# Patient Record
Sex: FEMALE | Race: WHITE | NOT HISPANIC OR LATINO | URBAN - METROPOLITAN AREA
[De-identification: names, ages, dates, MRNs, and addresses within clinical notes are randomized per-mention and may not be internally consistent; named-entity substitution may affect disease eponyms.]

---

## 2018-06-05 ENCOUNTER — OUTPATIENT (OUTPATIENT)
Dept: OUTPATIENT SERVICES | Facility: HOSPITAL | Age: 58
LOS: 1 days | End: 2018-06-05

## 2022-05-16 ENCOUNTER — APPOINTMENT (OUTPATIENT)
Dept: ORTHOPEDIC SURGERY | Facility: CLINIC | Age: 62
End: 2022-05-16
Payer: COMMERCIAL

## 2022-05-16 VITALS — BODY MASS INDEX: 29.88 KG/M2 | HEIGHT: 64 IN | WEIGHT: 175 LBS

## 2022-05-16 DIAGNOSIS — F17.200 NICOTINE DEPENDENCE, UNSPECIFIED, UNCOMPLICATED: ICD-10-CM

## 2022-05-16 PROBLEM — Z00.00 ENCOUNTER FOR PREVENTIVE HEALTH EXAMINATION: Status: ACTIVE | Noted: 2022-05-16

## 2022-05-16 PROCEDURE — 99213 OFFICE O/P EST LOW 20 MIN: CPT

## 2022-05-17 PROBLEM — F17.200 CURRENT SMOKER: Status: ACTIVE | Noted: 2022-05-16

## 2022-05-17 NOTE — HISTORY OF PRESENT ILLNESS
[Lower back] : lower back [Gradual] : gradual [6] : 6 [1] : 2 [Burning] : burning [Dull/Aching] : dull/aching [Radiating] : radiating [Throbbing] : throbbing [Tightness] : tightness [Tingling] : tingling [Full time] : Work status: full time [de-identified] : Patient states the severity of her pain is the same since her previous visit. No new complaints. Patient states her pain radiates from lower back into LT lower extremity. Limited ROM of LT lower extremity. Patient states she was approved for lumbar spine MRI scan is awaiting to schedule her appointment. Patient has continued with at home exercises/stretches as best tolerated. Treatment with OTC NSAIDs when needed provide relief. [] : no [FreeTextEntry5] : LOWER BACK PAIN STARTED ABOUT 3 YRS AGO. [FreeTextEntry6] : WEAKNESS, LACK OF MOBILITY [FreeTextEntry7] : LT LEG [de-identified] : DR. TAMEZ(02/2021) [de-identified] : CT L-SPINE DONE AT  01/2021 [de-identified] [facility]

## 2022-05-17 NOTE — DATA REVIEWED
[Outside X-rays] : outside x-rays [Lumbar Spine] : lumbar spine [I independently reviewed and interpreted images and report] : I independently reviewed and interpreted images and report [I reviewed the films/CD and additionally noted] : I reviewed the films/CD and additionally noted [FreeTextEntry1] : I stop paperwork reviewed

## 2022-05-17 NOTE — DISCUSSION/SUMMARY
[Medication Risks Reviewed] : Medication risks reviewed [de-identified] : Discussed proper body mechanics and modified physical activity to avoid aggravation of symptoms. Patient referred for lumbar spine MRI scan for worsening lower back pain radiating into LT lower extremity with worsening weakness despite medical intervention. Evaluate for disc herniation vs stenosis. Patient will follow up after MRI scan. Patient will continue with at home exercises/stretches as best tolerated and treatment with OTC NSAIDs as needed.

## 2022-05-17 NOTE — PHYSICAL EXAM
[Normal Coordination] : normal coordination [Normal DTR UE/LE] : normal DTR UE/LE  [Normal Sensation] : normal sensation [Normal Mood and Affect] : normal mood and affect [Orientated] : orientated [Able to Communicate] : able to communicate [Normal Skin] : normal skin [No Rash] : no rash [No Ulcers] : no ulcers [No Lesions] : no lesions [No obvious lymphadenopathy in areas examined] : no obvious lymphadenopathy in areas examined [Well Developed] : well developed [Well Nourished] : well nourished [Peripheral vascular exam is grossly normal] : peripheral vascular exam is grossly normal [No Respiratory Distress] : no respiratory distress [Lungs clear to auscultation bilaterally] : lungs clear to auscultation bilaterally [NL (90)] : forward flexion 90 degrees [NL (30)] : right lateral rotation 30 degrees [NL (45)] : extension 45 degrees [NL (40)] : right lateral bending 40 degrees [Flexion] : flexion [Extension] : extension [3___] : left hip flexion 3[unfilled]/5 [5___] : right extensor hallicus longus 5[unfilled]/5 [] : non-antalgic

## 2023-06-19 ENCOUNTER — APPOINTMENT (OUTPATIENT)
Dept: ORTHOPEDIC SURGERY | Facility: CLINIC | Age: 63
End: 2023-06-19
Payer: COMMERCIAL

## 2023-06-19 VITALS — BODY MASS INDEX: 29.88 KG/M2 | HEIGHT: 64 IN | WEIGHT: 175 LBS

## 2023-06-19 DIAGNOSIS — Z78.9 OTHER SPECIFIED HEALTH STATUS: ICD-10-CM

## 2023-06-19 PROCEDURE — 72100 X-RAY EXAM L-S SPINE 2/3 VWS: CPT

## 2023-06-19 PROCEDURE — 99214 OFFICE O/P EST MOD 30 MIN: CPT

## 2023-06-19 PROCEDURE — 73503 X-RAY EXAM HIP UNI 4/> VIEWS: CPT | Mod: LT

## 2023-06-19 RX ORDER — MELOXICAM 15 MG/1
15 TABLET ORAL DAILY
Qty: 30 | Refills: 1 | Status: ACTIVE | COMMUNITY
Start: 2023-06-19 | End: 1900-01-01

## 2023-06-25 NOTE — HISTORY OF PRESENT ILLNESS
[Gradual] : gradual [8] : 8 [5] : 5 [Radiating] : radiating [Frequent] : frequent [Heat] : heat [Standing] : standing [Walking] : walking [Stairs] : stairs [Full time] : Work status: full time [de-identified] : 06/19/2023 - Chief complaint of low back pain radiating into the left buttocks, posterior thigh, lateral calf. also c/o subjective weakness. symptoms are worse since she was last seen. pain worse with stairs, walking. no relief from nsaid usage. completed treatment with - no change in symptoms. weakness chronic, progressive. \par \par 5/16/23 - Patient states the severity of her pain is the same since her previous visit. No new complaints. Patient states her pain radiates from lower back into LT lower extremity. Limited ROM of LT lower extremity. Patient states she was approved for lumbar spine MRI scan is awaiting to schedule her appointment. Patient has continued with at home exercises/stretches as best tolerated. Treatment with OTC NSAIDs when needed provide relief. 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

## 2023-06-25 NOTE — DATA REVIEWED
[FreeTextEntry1] : On my interpretation of these images \par in office x-rays Lumbar spine ap/lat demonstrates degenerative scoliosis L3/4. good lordosis\par HIP XRAY taken today demonstrates no fx. no OA. \par \par I stop paperwork reviewed

## 2023-06-25 NOTE — PHYSICAL EXAM
[Normal Coordination] : normal coordination [Normal DTR UE/LE] : normal DTR UE/LE  [Normal Sensation] : normal sensation [Normal Mood and Affect] : normal mood and affect [Orientated] : orientated [Able to Communicate] : able to communicate [Normal Skin] : normal skin [No Rash] : no rash [No Ulcers] : no ulcers [No Lesions] : no lesions [No obvious lymphadenopathy in areas examined] : no obvious lymphadenopathy in areas examined [Well Developed] : well developed [Well Nourished] : well nourished [Peripheral vascular exam is grossly normal] : peripheral vascular exam is grossly normal [No Respiratory Distress] : no respiratory distress [Lungs clear to auscultation bilaterally] : lungs clear to auscultation bilaterally [Flexion] : flexion [Extension] : extension [4___] : left dorsiflexors 4[unfilled]/5 [] : non-antalgic [de-identified] : RT LE intact. left hip flexors 4/5

## 2023-06-25 NOTE — DISCUSSION/SUMMARY
This is a video visit.  The patient is interviewed after she gave verbal consent.    HPI:  The patient reported that she has been feeling \"okay\".    At this time of evaluation she denies feeling depressed.  She reports fair appetite.  She seemed to be more alert and attentive after her medication regimen was modified, denies  having fatigue, apathy and anhedonia.   Denies having difficulty in  concentration or memory .  Denies having episodes of  palpitation with chest pressure anymore.   She has been taking her psychotropics as directed. When asked about the potential ADRs she denies  having dizziness/vertigo anymore.    As far as her current main concern the patient reports that on 6/16/2020 she had fusion of hallux interphalangeal joint (IPJ) left .  She has been working from home.  Additionally  she needs a sleep study because she may have obstructive sleep apnea.  But current insurance does not approve the sleep study at the sleep center, instead she has to get the sleep  study at home.   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  She is working from home.   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  But she lost the job in June 2019.    She also said she  has a dysfunctional marriage.  She has been  to her  for 20 years but she claimed that the marriage is dysfunctional.  She feels that she and her  are disconnected and they are  not working as a team member or partner.  Instead she said she is taking care of her  and he shows no interest in sharing chores  at home.  She claimed that her  is \" lazy, demanding, negative and critical\".  Additionally  she said her  has difficulty in anger and impulsivity control. He can be verbally abusive, but denies he had physical aggression to his family members.    She has a hunch that her  may have Boise's disease because he has a  [Medication Risks Reviewed] : Medication risks reviewed strong family history of Shia[place]see's disease.  Additionally she is worried about her son who has been diagnosed with  Marfan's syndrome and he had a surgery at [facility]  for prevention of aortic dissection.    In terms of her past psychiatric history patient reported that she  started psychiatric treatment in 2003 and she saw  Dr. Alondra Sanderson at [facility]. She was diagnosed with  Generalized anxiety. Major depression,  Impulse control disorder with trichotillomania.  She was placed on the following med regimen by Dr Sanderson:    sertraline 100 mg two daily.  lorazepam 1 mg twice daily.  Bupropion  mg one daily .  She was placed the following med regimen by Dr Camp:    sertraline 100mg every 12 hours; bupropionXL 300mg every AM;  lorazepam 0.5mg every AM and  clonazepam 0.25mg every bedtime as needed for sleep.  She was placed the following med regimen by Dr Miller:    Sertraline 100mg every 12 hours ;  Bupropion 300mg every AM ; Clonazepam 0.5mg TID  When she saw Swati Montague and Jose Ramon she was put the same med regimen as Dr Miller did.   She   has no hx of inpatient treatment. She used to see Violet Franco  for therapy. She tried Paxil, Zoloft, wellbutrin, Lorazepam and Clonazepam before. She said she can't tolerate Paxil due to ADRs.           Patient Active Problem List   Diagnosis   • Hypothyroidism   • Irritable bowel syndrome   • Allergic rhinitis, cause unspecified   • Tobacco use disorder   • Major depressive disorder, recurrent episode (HCC)   • Generalized anxiety disorder   • Reflux esophagitis   • Relationship problems specific to childhood and adolescence   • Venous (peripheral) insufficiency   • Insomnia   • Carpal tunnel syndrome   • Cervicalgia   • Muscle weakness (generalized)   • Pain in thoracic spine   • Plantar fascial fibromatosis of left foot   • Achilles bursitis   • Heel spur   • Retrocalcaneal bursitis (back of heel)   • Left foot pain   • Hyperlipemia   • Urge  [de-identified] : In office x-rays Lumbar spine ap/lat demonstrates degenerative scoliosis L3/4. good lordosis\par HIP XRAY taken today demonstrates no fx. no OA. \par Patient referred for lumbar spine CT scan for worsening lower back pain radiating into LT lower extremity with worsening weakness despite medical intervention (nsaid usage, exercise based rehabilitation with ) . Evaluate for disc herniation vs stenosis. Patient will follow up after CT scan. Patient will continue with at home exercises/stretches as best tolerated and treatment with OTC NSAIDs as needed. F/U after CT Scan. \par \par Prior to appointment and during encounter with patient extensive medical records were reviewed including but not limited to, hospital records, outpatient records, imaging results, and lab data.During this appointment the patient was examined, diagnoses were discussed and explained in a face to face manner. In addition extensive time was spent reviewing aforementioned diagnostic studies. Counseling including abnormal image results, differential diagnoses, treatment options, risk and benefits, lifestyle changes, current condition, and current medications was performed. Patient's comments, questions, and concerns were addressed and patient verbalized understanding. Based on this patient's presentation at our office, which is an orthopedic spine surgeon's office, this patient inherently / intrinsically has a risk, however minute, of developing issues such as Cauda equina syndrome, bowel and bladder changes, or progression of motor or neurological deficits such as paralysis which may be permanent.\par \par ZAMZAM KELLY Acting as a Scribe for Zamzam Pacheco, attest that this documentation has been prepared under the direction and in the presence of Provider Emile Jauregui MD.  urinary incontinence   • SHAUN (stress urinary incontinence, female)   • Pelvic floor dysfunction   • Pelvic floor weakness in female         Current Outpatient Medications   Medication Sig Dispense Refill   • VITAMIN D PO      • hydrochlorothiazide (HYDRODIURIL) 12.5 MG tablet Take 1 tablet by mouth daily as needed (leg swelling). 30 tablet 3   • buPROPion (WELLBUTRIN SR) 150 MG 12 hr tablet Take 1 tablet by mouth daily. 30 tablet 0   • tiZANidine (ZANAFLEX) 4 MG tablet TAKE 1 TABLET BY MOUTH AT BEDTIME 30 tablet 0   • ibuprofen (MOTRIN) 200 MG tablet Take 200 mg by mouth every 6 hours as needed for Pain.     • pantoprazole (PROTONIX) 40 MG tablet Take 1 tablet by mouth 2 times daily. 60 tablet 5   • LORazepam (ATIVAN) 0.5 MG tablet Take 1 tablet by mouth 2 times daily as needed for Anxiety. 37 tablet 5   • sertraline (ZOLOFT) 100 MG tablet Take 1 tablet by mouth every 12 hours. 60 tablet 5   • propranolol (INDERAL LA) 60 MG 24 hr capsule Take 1 capsule by mouth nightly. TAKE 1 CAPSULE BY MOUTH BEFORE BEDTIME 30 capsule 5   • levothyroxine (SYNTHROID, LEVOTHROID) 100 MCG tablet Take 1 tablet by mouth daily. 90 tablet 2   • triamcinolone (ARISTOCORT) 0.1 % cream Apply topically 2 times daily. BID, PRN 30 g 1   • ipratropium (ATROVENT) 0.03 % nasal spray Spray 2 sprays in each nostril every 12 hours. 30 mL 6   • albuterol 108 (90 Base) MCG/ACT inhaler Inhale 1-2 puffs into the lungs every 4 hours. As need for wheezing     • aspirin 81 MG chewable tablet 1 TABLET DAILY     • fluticasone (FLONASE) 50 MCG/ACT nasal spray Spray 1 spray in each nostril. Spray 1 spray in each nostril.     • Omega-3 Fatty Acids (FISH OIL) 500 MG capsule        No current facility-administered medications for this visit.          SOCIAL HISTORY:   Patient is originally from [place]. She grew up in intact family.  She has high school education, employed as , working for a small company.  She is thinking of going back to [employer] to get college degree in accounting.    Currently, patient says that she has 3 children. She has been  twice and now is  for the second time and feels the marriage is dysfunctional. She disclosed that she has a boyfriend whom she is seeing in a romantic fashion.    She reports having family hx of  anxiety and  Depression. Patient denies smoking cigarettes.  occasionally she would smoke marijuana with her friends.       MENTAL STATUS EXAMINATION:   1.              Appearance: Well groomed and good hygiene  2.              Behavior:  friendly, cooperative  3.              Motor Activity:normal   4.              Speech:        a.             Volume: normal        b.             Rate: normal   5..             Mood: \"Pretty good\"  6.              Affect: calm and smiling  7.              Thought: process: Sequential  8.              TC:    no perceptual disturbance.    9.                 Cognitive Functioning:  Grossly intact.               ASSESSMENT  :   Axis I: Dysthymia;  generalized anxiety disorder; impulse control disorder, NOS; improved  Cannabis use disorder , in remission  Axis II: cluster B features.      PLAN:     #1.  Dysthymia, improved  Continue Wellbutrin 150 mg daily  Continue sertraline 100 mg twice daily    #2.  Generalized anxiety disorder,  improved  Continue sertraline 100 mg twice daily  Continue propanolol 60 mg at night  Continue lorazepam 0.5 mg qd PRN    During session patient is encouraged  to express her concerns by providing empathetic listening.  We discussed stress management.       They were also given counseling on sleep hygiene.    Discussed the neuropsychiatric symptomatology of cannabis use and how smoking marijuana affects her sleep architecture  Discussed with the patient about symptomatology of obstructive sleep apnea and how GIA affects her mental status and her cognitive function  Discussed with the patient about psychopharmacology of SSRI, Wellbutrin,   beta-blocker and benzodiazepines, clinical indications of taking her psychotropic medications and potential side effects    RTC in 3-6    months for follow-up. Call the office prn.      25  minutes spent face-to-face with the patient, greater than 50% of time spent in counseling, discussion of her diagnosis, management, progression and prognosis

## 2023-07-03 ENCOUNTER — RESULT REVIEW (OUTPATIENT)
Age: 63
End: 2023-07-03

## 2023-08-21 ENCOUNTER — APPOINTMENT (OUTPATIENT)
Dept: ORTHOPEDIC SURGERY | Facility: CLINIC | Age: 63
End: 2023-08-21
Payer: COMMERCIAL

## 2023-08-21 VITALS — HEIGHT: 64 IN | BODY MASS INDEX: 29.88 KG/M2 | WEIGHT: 175 LBS

## 2023-08-21 DIAGNOSIS — M54.16 RADICULOPATHY, LUMBAR REGION: ICD-10-CM

## 2023-08-21 PROCEDURE — 99214 OFFICE O/P EST MOD 30 MIN: CPT

## 2023-08-21 RX ORDER — MELOXICAM 15 MG/1
15 TABLET ORAL DAILY
Qty: 30 | Refills: 0 | Status: ACTIVE | COMMUNITY
Start: 2023-08-21 | End: 1900-01-01

## 2023-08-23 PROBLEM — M54.16 LUMBAR RADICULOPATHY: Status: ACTIVE | Noted: 2022-05-16

## 2023-08-23 NOTE — DISCUSSION/SUMMARY
[Medication Risks Reviewed] : Medication risks reviewed [de-identified] : Lumbar CT scan reviewed in comparison to previous CT reveals no change in L5-S1, no vertebral fx, no loss of disc height, facet arthropathy at L4-5. Patient was advised to follow up with pain management for lumbar spine injections, she will hold off for now. Patient was provided with a referral for lumbar physical therapy to work on stretching, strengthening and range of motion. Patient will continue with at home exercises/stretches as best tolerated and treatment with OTC NSAIDs as needed. I am renewing the patient's meloxicam rx as it is providing relief. F/U in 3 months.   Prior to appointment and during encounter with patient extensive medical records were reviewed including but not limited to, hospital records, outpatient records, imaging results, and lab data.During this appointment the patient was examined, diagnoses were discussed and explained in a face to face manner. In addition extensive time was spent reviewing aforementioned diagnostic studies. Counseling including abnormal image results, differential diagnoses, treatment options, risk and benefits, lifestyle changes, current condition, and current medications was performed. Patient's comments, questions, and concerns were addressed and patient verbalized understanding. Based on this patient's presentation at our office, which is an orthopedic spine surgeon's office, this patient inherently / intrinsically has a risk, however minute, of developing issues such as Cauda equina syndrome, bowel and bladder changes, or progression of motor or neurological deficits such as paralysis which may be permanent.  MITA ANDRADE Acting as a Scribe for Mita Bautista, attest that this documentation has been prepared under the direction and in the presence of Provider Emile Jauregui MD.

## 2023-08-23 NOTE — PHYSICAL EXAM
[4___] : left dorsiflexors 4[unfilled]/5 [5___] : left extensor hallicus longus 5[unfilled]/5 [Normal Coordination] : normal coordination [Normal DTR UE/LE] : normal DTR UE/LE  [Normal Sensation] : normal sensation [Normal Mood and Affect] : normal mood and affect [Orientated] : orientated [Able to Communicate] : able to communicate [Normal Skin] : normal skin [No Rash] : no rash [No Ulcers] : no ulcers [No Lesions] : no lesions [No obvious lymphadenopathy in areas examined] : no obvious lymphadenopathy in areas examined [Well Developed] : well developed [Peripheral vascular exam is grossly normal] : peripheral vascular exam is grossly normal [No Respiratory Distress] : no respiratory distress [de-identified] : Constitutional: - General Appearance: Unremarkable Body Habitus Well Developed Well Nourished Body Habitus No Deformities Well Groomed Ability To communicate: Normal Neurologic: Global sensation is intact to upper and lower extremities. See examination of Neck and/or Spine for exceptions. Orientation to Time, Place and Person is: Normal Mood And Affect is Normal Skin: - Head/Face, Right Upper/Lower Extremity, Left Upper/Lower Extremity: Normal See Examination of Neck and/or Spine for exceptions Cardiovascular: Peripheral Cardiovascular System is Normal Palpation of Lymph Nodes: Normal Palpation of lymph nodes in: Axilla, Cervical, Inguinal Abnormal Palpation of lymph nodes in: None  [Extension] : extension [] : clonus not sustained at ankle

## 2023-08-23 NOTE — HISTORY OF PRESENT ILLNESS
[3] : 3 [2] : 2 [Full time] : Work status: full time [de-identified] : 08/21/2023: Patient presenting today for a CT scan review. Similar symptoms to last visit. C/o weakness in left leg. States she has to physically lift her leg with hand getting into and out of her car. Difficulty with tying shoes. Meloxicam providing significant relief in symptoms.   06/19/2023 - Chief complaint of low back pain radiating into the left buttocks, posterior thigh, lateral calf. also c/o subjective weakness. symptoms are worse since she was last seen. pain worse with stairs, walking. no relief from nsaid usage. completed treatment with - no change in symptoms. weakness chronic, progressive.   5/16/23 - Patient states the severity of her pain is the same since her previous visit. No new complaints. Patient states her pain radiates from lower back into LT lower extremity. Limited ROM of LT lower extremity. Patient states she was approved for lumbar spine MRI scan is awaiting to schedule her appointment. Patient has continued with at home exercises/stretches as best tolerated. Treatment with OTC NSAIDs when needed provide relief. [de-identified] : no treatment at this time

## 2023-08-23 NOTE — DATA REVIEWED
[CT Scan] : CT scan [Lumbar Spine] : lumbar spine [Report was reviewed and noted in the chart] : The report was reviewed and noted in the chart [I independently reviewed and interpreted images and report] : I independently reviewed and interpreted images and report [FreeTextEntry1] : I stop paperwork reviewed